# Patient Record
Sex: MALE | Race: AMERICAN INDIAN OR ALASKA NATIVE | NOT HISPANIC OR LATINO | URBAN - METROPOLITAN AREA
[De-identification: names, ages, dates, MRNs, and addresses within clinical notes are randomized per-mention and may not be internally consistent; named-entity substitution may affect disease eponyms.]

---

## 2021-05-31 ENCOUNTER — EMERGENCY (EMERGENCY)
Facility: HOSPITAL | Age: 61
LOS: 0 days | Discharge: HOME | End: 2021-05-31
Attending: EMERGENCY MEDICINE
Payer: MEDICAID

## 2021-05-31 VITALS
DIASTOLIC BLOOD PRESSURE: 82 MMHG | OXYGEN SATURATION: 96 % | RESPIRATION RATE: 18 BRPM | SYSTOLIC BLOOD PRESSURE: 140 MMHG | HEART RATE: 80 BPM | TEMPERATURE: 99 F

## 2021-05-31 VITALS — WEIGHT: 169.98 LBS

## 2021-05-31 DIAGNOSIS — R10.31 RIGHT LOWER QUADRANT PAIN: ICD-10-CM

## 2021-05-31 DIAGNOSIS — I86.1 SCROTAL VARICES: ICD-10-CM

## 2021-05-31 DIAGNOSIS — N50.819 TESTICULAR PAIN, UNSPECIFIED: ICD-10-CM

## 2021-05-31 DIAGNOSIS — N43.3 HYDROCELE, UNSPECIFIED: ICD-10-CM

## 2021-05-31 DIAGNOSIS — K40.90 UNILATERAL INGUINAL HERNIA, WITHOUT OBSTRUCTION OR GANGRENE, NOT SPECIFIED AS RECURRENT: ICD-10-CM

## 2021-05-31 LAB
ALBUMIN SERPL ELPH-MCNC: 4.5 G/DL — SIGNIFICANT CHANGE UP (ref 3.3–5)
ALP SERPL-CCNC: 119 U/L — SIGNIFICANT CHANGE UP (ref 40–120)
ALT FLD-CCNC: 58 U/L — SIGNIFICANT CHANGE UP (ref 12–78)
ANION GAP SERPL CALC-SCNC: 6 MMOL/L — SIGNIFICANT CHANGE UP (ref 5–17)
APPEARANCE UR: CLEAR — SIGNIFICANT CHANGE UP
AST SERPL-CCNC: 78 U/L — HIGH (ref 15–37)
BASOPHILS # BLD AUTO: 0.04 K/UL — SIGNIFICANT CHANGE UP (ref 0–0.2)
BASOPHILS NFR BLD AUTO: 0.6 % — SIGNIFICANT CHANGE UP (ref 0–2)
BILIRUB SERPL-MCNC: 0.5 MG/DL — SIGNIFICANT CHANGE UP (ref 0.2–1.2)
BILIRUB UR-MCNC: NEGATIVE — SIGNIFICANT CHANGE UP
BUN SERPL-MCNC: 7 MG/DL — SIGNIFICANT CHANGE UP (ref 7–23)
CALCIUM SERPL-MCNC: 10.2 MG/DL — HIGH (ref 8.5–10.1)
CHLORIDE SERPL-SCNC: 105 MMOL/L — SIGNIFICANT CHANGE UP (ref 96–108)
CO2 SERPL-SCNC: 29 MMOL/L — SIGNIFICANT CHANGE UP (ref 22–31)
COLOR SPEC: YELLOW — SIGNIFICANT CHANGE UP
CREAT SERPL-MCNC: 0.82 MG/DL — SIGNIFICANT CHANGE UP (ref 0.5–1.3)
DIFF PNL FLD: NEGATIVE — SIGNIFICANT CHANGE UP
EOSINOPHIL # BLD AUTO: 0.44 K/UL — SIGNIFICANT CHANGE UP (ref 0–0.5)
EOSINOPHIL NFR BLD AUTO: 6.6 % — HIGH (ref 0–6)
GLUCOSE SERPL-MCNC: 140 MG/DL — HIGH (ref 70–99)
GLUCOSE UR QL: 100 MG/DL
HCT VFR BLD CALC: 45.1 % — SIGNIFICANT CHANGE UP (ref 39–50)
HGB BLD-MCNC: 15 G/DL — SIGNIFICANT CHANGE UP (ref 13–17)
IMM GRANULOCYTES NFR BLD AUTO: 0.3 % — SIGNIFICANT CHANGE UP (ref 0–1.5)
KETONES UR-MCNC: NEGATIVE — SIGNIFICANT CHANGE UP
LEUKOCYTE ESTERASE UR-ACNC: NEGATIVE — SIGNIFICANT CHANGE UP
LYMPHOCYTES # BLD AUTO: 2.56 K/UL — SIGNIFICANT CHANGE UP (ref 1–3.3)
LYMPHOCYTES # BLD AUTO: 38.3 % — SIGNIFICANT CHANGE UP (ref 13–44)
MCHC RBC-ENTMCNC: 26.9 PG — LOW (ref 27–34)
MCHC RBC-ENTMCNC: 33.3 GM/DL — SIGNIFICANT CHANGE UP (ref 32–36)
MCV RBC AUTO: 81 FL — SIGNIFICANT CHANGE UP (ref 80–100)
MONOCYTES # BLD AUTO: 0.48 K/UL — SIGNIFICANT CHANGE UP (ref 0–0.9)
MONOCYTES NFR BLD AUTO: 7.2 % — SIGNIFICANT CHANGE UP (ref 2–14)
NEUTROPHILS # BLD AUTO: 3.15 K/UL — SIGNIFICANT CHANGE UP (ref 1.8–7.4)
NEUTROPHILS NFR BLD AUTO: 47 % — SIGNIFICANT CHANGE UP (ref 43–77)
NITRITE UR-MCNC: NEGATIVE — SIGNIFICANT CHANGE UP
PH UR: 7 — SIGNIFICANT CHANGE UP (ref 5–8)
PLATELET # BLD AUTO: 178 K/UL — SIGNIFICANT CHANGE UP (ref 150–400)
POTASSIUM SERPL-MCNC: 4.5 MMOL/L — SIGNIFICANT CHANGE UP (ref 3.5–5.3)
POTASSIUM SERPL-SCNC: 4.5 MMOL/L — SIGNIFICANT CHANGE UP (ref 3.5–5.3)
PROT SERPL-MCNC: 8.2 GM/DL — SIGNIFICANT CHANGE UP (ref 6–8.3)
PROT UR-MCNC: NEGATIVE MG/DL — SIGNIFICANT CHANGE UP
RBC # BLD: 5.57 M/UL — SIGNIFICANT CHANGE UP (ref 4.2–5.8)
RBC # FLD: 13.3 % — SIGNIFICANT CHANGE UP (ref 10.3–14.5)
SODIUM SERPL-SCNC: 140 MMOL/L — SIGNIFICANT CHANGE UP (ref 135–145)
SP GR SPEC: 1 — LOW (ref 1.01–1.02)
UROBILINOGEN FLD QL: NEGATIVE MG/DL — SIGNIFICANT CHANGE UP
WBC # BLD: 6.69 K/UL — SIGNIFICANT CHANGE UP (ref 3.8–10.5)
WBC # FLD AUTO: 6.69 K/UL — SIGNIFICANT CHANGE UP (ref 3.8–10.5)

## 2021-05-31 PROCEDURE — 87086 URINE CULTURE/COLONY COUNT: CPT

## 2021-05-31 PROCEDURE — 99285 EMERGENCY DEPT VISIT HI MDM: CPT

## 2021-05-31 PROCEDURE — 85025 COMPLETE CBC W/AUTO DIFF WBC: CPT

## 2021-05-31 PROCEDURE — 99284 EMERGENCY DEPT VISIT MOD MDM: CPT | Mod: 25

## 2021-05-31 PROCEDURE — 76870 US EXAM SCROTUM: CPT | Mod: 26

## 2021-05-31 PROCEDURE — 80053 COMPREHEN METABOLIC PANEL: CPT

## 2021-05-31 PROCEDURE — 36415 COLL VENOUS BLD VENIPUNCTURE: CPT

## 2021-05-31 PROCEDURE — 74177 CT ABD & PELVIS W/CONTRAST: CPT | Mod: 26,MA

## 2021-05-31 PROCEDURE — 81003 URINALYSIS AUTO W/O SCOPE: CPT

## 2021-05-31 PROCEDURE — 74177 CT ABD & PELVIS W/CONTRAST: CPT

## 2021-05-31 PROCEDURE — 76870 US EXAM SCROTUM: CPT

## 2021-05-31 NOTE — ED STATDOCS - CLINICAL SUMMARY MEDICAL DECISION MAKING FREE TEXT BOX
Pt with small varicocele, hydrocele, fat containing inguinal hernia.  D/c home with surgical follow up.

## 2021-05-31 NOTE — ED STATDOCS - OBJECTIVE STATEMENT
59 y/o male with no pertinent PMHX presents to the ED c/o RLQ abd pain x 3 days. Pt reports aching pain. Pt reports heavy lifting performed regularly, denies acute trauma, injuries, or falls. Denies fevers, n/v/d, or other presenting symptoms. Pt states pain present 3 months ago, resolved on own, ?inguinal hernia. No other complaints.

## 2021-05-31 NOTE — ED STATDOCS - PROGRESS NOTE DETAILS
US showing inguinal hernia but will confirm only fat containing on CT.  SMall hydrocele nad Pt. is a 60 year old male presents with RLQ/inguinal pain x 3 days.  Pt. reports heavy lifting regularly but no trauma.  Neg. swelling reported.  Mild testicular pain.  Pain started 3 months ago.  ?inguinal hernia.  will Us and obtain labs.  Sonia Cannon PA-C

## 2021-05-31 NOTE — ED STATDOCS - PATIENT PORTAL LINK FT
You can access the FollowMyHealth Patient Portal offered by [facility] by registering at the following website: [email or web address]. By joining [employer]’s FollowMyHealth portal, you will also be able to view your health information using other applications (apps) compatible with our system.

## 2021-05-31 NOTE — ED ADULT NURSE NOTE - NSIMPLEMENTINTERV_GEN_ALL_ED
Implemented All Universal Safety Interventions:  [place] to call system. Call bell, personal items and telephone within reach. Instruct patient to call for assistance. Room bathroom lighting operational. Non-slip footwear when patient is off stretcher. Physically safe environment: no spills, clutter or unnecessary equipment. Stretcher in lowest position, wheels locked, appropriate side rails in place.

## 2021-05-31 NOTE — ED STATDOCS - GASTROINTESTINAL, MLM
abdomen soft, +TTP along right spermatic cord. No palpable hernia. No overlying rash or redness. Mild posterior right testicular TTP with no swelling

## 2021-06-01 LAB
CULTURE RESULTS: NO GROWTH — SIGNIFICANT CHANGE UP
SPECIMEN SOURCE: SIGNIFICANT CHANGE UP

## 2023-12-10 ENCOUNTER — EMERGENCY (EMERGENCY)
Facility: HOSPITAL | Age: 63
LOS: 0 days | Discharge: HOME | End: 2023-12-10
Attending: EMERGENCY MEDICINE
Payer: MEDICAID

## 2023-12-10 VITALS
OXYGEN SATURATION: 99 % | HEART RATE: 80 BPM | TEMPERATURE: 98 F | SYSTOLIC BLOOD PRESSURE: 155 MMHG | DIASTOLIC BLOOD PRESSURE: 92 MMHG | RESPIRATION RATE: 18 BRPM

## 2023-12-10 DIAGNOSIS — R05.9 COUGH, UNSPECIFIED: ICD-10-CM

## 2023-12-10 DIAGNOSIS — J02.9 ACUTE PHARYNGITIS, UNSPECIFIED: ICD-10-CM

## 2023-12-10 PROCEDURE — 99283 EMERGENCY DEPT VISIT LOW MDM: CPT | Mod: 25

## 2023-12-10 PROCEDURE — 99284 EMERGENCY DEPT VISIT MOD MDM: CPT

## 2023-12-10 PROCEDURE — 71046 X-RAY EXAM CHEST 2 VIEWS: CPT

## 2023-12-10 PROCEDURE — 71046 X-RAY EXAM CHEST 2 VIEWS: CPT | Mod: 26

## 2023-12-10 RX ORDER — AZITHROMYCIN 500 MG/1
500 TABLET, FILM COATED ORAL ONCE
Refills: 0 | Status: COMPLETED | OUTPATIENT
Start: 2023-12-10 | End: 2023-12-10

## 2023-12-10 RX ORDER — AZITHROMYCIN 500 MG/1
1 TABLET, FILM COATED ORAL
Qty: 4 | Refills: 0
Start: 2023-12-10 | End: 2023-12-13

## 2023-12-10 RX ADMIN — AZITHROMYCIN 500 MILLIGRAM(S): 500 TABLET, FILM COATED ORAL at 08:57

## 2023-12-10 NOTE — ED ADULT NURSE NOTE - NSFALLUNIVINTERV_ED_ALL_ED
Bed/Stretcher in lowest position, wheels locked, appropriate side rails in place/Call bell, personal items and telephone in reach/Instruct patient to call for assistance before getting out of bed/chair/stretcher/Non-slip footwear applied when patient is off stretcher/[place] to call system/Physically safe environment - no spills, clutter or unnecessary equipment/Purposeful proactive rounding/Room/bathroom lighting operational, light cord in reach Bed/Stretcher in lowest position, wheels locked, appropriate side rails in place/Call bell, personal items and telephone in reach/Instruct patient to call for assistance before getting out of bed/chair/stretcher/Non-slip footwear applied when patient is off stretcher/[place] to call system/Physically safe environment - no spills, clutter or unnecessary equipment/Purposeful proactive rounding/Room/bathroom lighting operational, light cord in reach

## 2023-12-10 NOTE — ED PROVIDER NOTE - OBJECTIVE STATEMENT
63 year old male with no pertinent PMH presents with cc of sore throat. Mild URI like symptoms including cough and congestion. No cp, sob or palpitation. No abdominal pain. No fever or chills. No melena or hematochezia. No visual or focal neurological complaints. Ambulatory. No saddle anesthesia. No incontinence of urine or stool. No skin rash. No recent trauma. No seizures, syncope. No other health concerns.

## 2023-12-10 NOTE — ED ADULT NURSE NOTE - OBJECTIVE STATEMENT
pt presents to Ed with complaints of sore throat and cough. per son at bedside, pt has been having symptoms x 1 week.

## 2023-12-10 NOTE — ED PROVIDER NOTE - NSFOLLOWUPINSTRUCTIONS_ED_ALL_ED_FT
Please note that I have reviewed your x-ray. I do not see any evidence of any pathology on the xray however please follow up with the official reports. Return to us if any other health concerns. I have offered you blood work and further evaluation but you wish for xray and meds.     Please note that I have reviewed your x-ray imaging. I have provided you with my impression. It will often take 24 to 48 hours to get the official reports of the x-ray. Please either access the x-ray reports real time by using the link provided here for patient portal access or contact the radiology number or alternatively please contact your primary care provider to get the results of the x-ray for you.     ________  Cough, Adult  Coughing is a reflex that clears your throat and airways (respiratory system). It helps heal and protect your lungs. It is normal to cough from time to time. A cough that happens with other symptoms or that lasts a long time may be a sign of a condition that needs treatment. A short-term (acute) cough may only last 2–3 weeks. A long-term (chronic) cough may last 8 or more weeks.    Coughing is often caused by:  Diseases, such as:  An infection of the respiratory system.  Asthma or other heart or lung diseases.  Gastroesophageal reflux. This is when acid comes back up from the stomach.  Breathing in things that irritate your lungs.  Allergies.  Postnasal drip. This is when mucus runs down the back of your throat.  Smoking.  Some medicines.  Follow these instructions at home:  Medicines    Take over-the-counter and prescription medicines only as told by your health care provider.  Talk with your provider before you take cough medicine (cough suppressants).  Eating and drinking    Do not drink alcohol.  Avoid caffeine.  Drink enough fluid to keep your pee (urine) pale yellow.  Lifestyle    Avoid cigarette smoke.  Do not use any products that contain nicotine or tobacco. These products include cigarettes, chewing tobacco, and vaping devices, such as e-cigarettes. If you need help quitting, ask your provider.  Avoid things that make you cough. These may include perfumes, candles, cleaning products, or campfire smoke.  General instructions    A person holding a cloth over the mouth and nose while coughing.  Watch for any changes to your cough. Tell your provider about them.  Always cover your mouth when you cough.  If the air is dry in your bedroom or home, use a cool mist vaporizer or humidifier.  If your cough is worse at night, try to sleep in a semi-upright position.  Rest as needed.  Contact a health care provider if:  You have new symptoms, or your symptoms get worse.  You cough up pus.  You have a fever that does not go away or a cough that does not get better after 2–3 weeks.  You cannot control your cough with medicine, and you are losing sleep.  You have pain that gets worse or is not helped with medicine.  You lose weight for no clear reason.  You have night sweats.  Get help right away if:  You cough up blood.  You have trouble breathing.  Your heart is beating very fast.  These symptoms may be an emergency. Get help right away. Call 911.  Do not wait to see if the symptoms will go away.  Do not drive yourself to the hospital.  This information is not intended to replace advice given to you by your health care provider. Make sure you discuss any questions you have with your health care provider.

## 2023-12-10 NOTE — ED PROVIDER NOTE - PATIENT PORTAL LINK FT
You can access the FollowMyHealth Patient Portal offered by [facility] by registering at the following website: [email or web address]. By joining [employer]’s FollowMyHealth portal, you will also be able to view your health information using other applications (apps) compatible with our system. You can access the FollowMyHealth Patient Portal offered by [facility] by registering at the following website: [email or web address]. By joining [employer]’s FollowMyHealth portal, you will also be able to view your health information using other applications (apps) compatible with our system.

## 2023-12-10 NOTE — ED PROVIDER NOTE - CLINICAL SUMMARY MEDICAL DECISION MAKING FREE TEXT BOX
cough, sore throat, no clinical evidence of pulmonary findings (no crackles, no wheezing, no tachypnea), abx, follow up with primary care provider. Patient has no abdominal pain, no neck pain or stiffness, no skin rash, no fever and no other health concerns. Strict return precautions provided. Spoke with patient's son who is the health care proxy and present. Offered patient, blood work, and further work up but patient happy with abx, and xray imaging.

## 2023-12-10 NOTE — ED PROVIDER NOTE - DIFFERENTIAL DIAGNOSIS
Differential Diagnosis cough, sore throat, no clinical evidence of pulmonary findings (no crackles, no wheezing, no tachypnea), abx, follow up with primary care provider. Patient has no abdominal pain, no neck pain or stiffness, no skin rash, no fever and no other health concerns. Strict return precautions provided. Spoke with patient's son who is the health care proxy and present. Offered patient, blood work, and further work up but patient happy with abx, and xray imaging.

## 2023-12-27 ENCOUNTER — EMERGENCY (EMERGENCY)
Facility: HOSPITAL | Age: 63
LOS: 0 days | Discharge: HOME | End: 2023-12-27
Attending: EMERGENCY MEDICINE
Payer: MEDICAID

## 2023-12-27 VITALS
HEART RATE: 87 BPM | TEMPERATURE: 99 F | OXYGEN SATURATION: 95 % | DIASTOLIC BLOOD PRESSURE: 69 MMHG | SYSTOLIC BLOOD PRESSURE: 117 MMHG | RESPIRATION RATE: 19 BRPM

## 2023-12-27 VITALS — WEIGHT: 160.06 LBS | HEIGHT: 65 IN

## 2023-12-27 DIAGNOSIS — E11.9 TYPE 2 DIABETES MELLITUS WITHOUT COMPLICATIONS: ICD-10-CM

## 2023-12-27 DIAGNOSIS — U07.1 COVID-19: ICD-10-CM

## 2023-12-27 DIAGNOSIS — R09.81 NASAL CONGESTION: ICD-10-CM

## 2023-12-27 DIAGNOSIS — I10 ESSENTIAL (PRIMARY) HYPERTENSION: ICD-10-CM

## 2023-12-27 DIAGNOSIS — E78.00 PURE HYPERCHOLESTEROLEMIA, UNSPECIFIED: ICD-10-CM

## 2023-12-27 LAB
FLUAV AG NPH QL: SIGNIFICANT CHANGE UP
FLUAV AG NPH QL: SIGNIFICANT CHANGE UP
FLUBV AG NPH QL: SIGNIFICANT CHANGE UP
FLUBV AG NPH QL: SIGNIFICANT CHANGE UP
RSV RNA NPH QL NAA+NON-PROBE: SIGNIFICANT CHANGE UP
RSV RNA NPH QL NAA+NON-PROBE: SIGNIFICANT CHANGE UP
S PYO AG SPEC QL IA: NEGATIVE — SIGNIFICANT CHANGE UP
S PYO AG SPEC QL IA: NEGATIVE — SIGNIFICANT CHANGE UP
SARS-COV-2 RNA SPEC QL NAA+PROBE: DETECTED
SARS-COV-2 RNA SPEC QL NAA+PROBE: DETECTED

## 2023-12-27 PROCEDURE — 87081 CULTURE SCREEN ONLY: CPT

## 2023-12-27 PROCEDURE — 87880 STREP A ASSAY W/OPTIC: CPT

## 2023-12-27 PROCEDURE — 99285 EMERGENCY DEPT VISIT HI MDM: CPT

## 2023-12-27 PROCEDURE — 99284 EMERGENCY DEPT VISIT MOD MDM: CPT

## 2023-12-27 PROCEDURE — 0241U: CPT

## 2023-12-27 RX ORDER — ACETAMINOPHEN 500 MG
650 TABLET ORAL ONCE
Refills: 0 | Status: COMPLETED | OUTPATIENT
Start: 2023-12-27 | End: 2023-12-27

## 2023-12-27 RX ADMIN — Medication 600 MILLIGRAM(S): at 06:47

## 2023-12-27 RX ADMIN — Medication 650 MILLIGRAM(S): at 06:47

## 2023-12-27 NOTE — ED ADULT NURSE NOTE - CAS TRG GEN SKIN COLOR
Secondary Intention Text (Leave Blank If You Do Not Want): The defect will heal with secondary intention. Normal for race

## 2023-12-27 NOTE — ED PROVIDER NOTE - PHYSICAL EXAMINATION
Constitutional: NAD AAOx3  Eyes: PERRLA EOMI  ENT: mild posterior pharynx erythema uvula midline no signs of PTA full ROM of neck no meningismus  Head: Normocephalic atraumatic  Mouth: MMM  Cardiac: regular rate no LE swelling  Resp: unlabored breathing clear b/l  GI: Abd s/nt/nd  Neuro: grossly normal and intact  Skin: No visible rashes

## 2023-12-27 NOTE — ED ADULT TRIAGE NOTE - CHIEF COMPLAINT QUOTE
pt ambulatory to triage c/o flu like symptoms. pt was here 2 weeks ago with similar symptoms, started and finished course of antibiotics but symptoms returned 2 days ago. pt endorsing cough, congestion, fever tmax 101. pt took advil prior to arrival. denies chest pain, shortness of breath. past medical history DM, HTN, thyroid disease, HLD. pt is a/o4 and well appearing in triage.

## 2023-12-27 NOTE — ED ADULT NURSE NOTE - BIRTH SEX
Male Consent (Ear)/Introductory Paragraph: The rationale for Mohs was explained to the patient and consent was obtained. The risks, benefits and alternatives to therapy were discussed in detail. Specifically, the risks of ear deformity, infection, scarring, bleeding, prolonged wound healing, incomplete removal, allergy to anesthesia, nerve injury and recurrence were addressed. Prior to the procedure, the treatment site was clearly identified and confirmed by the patient. All components of Universal Protocol/PAUSE Rule completed.

## 2023-12-27 NOTE — ED PROVIDER NOTE - OBJECTIVE STATEMENT
63-year-old male history of hypertension high cholesterol diabetes presents the emergency department for upper respiratory symptoms.  Patient is here with son who is a radiology tech in the emergency department.  States that about 3 weeks ago patient had similar symptoms was diagnosed with upper respiratory infection and was prescribed azithromycin as well as Mucinex which helped the symptoms.  Patient's been using almond oil for nasal congestion.  2 days ago developed sore throats cough congestion again so came in for evaluation.  No chest pain or shortness of breath no abdominal pain no leg swelling.  Patient had fever last night.  Here on exam patient is well-appearing nontoxic he has mild erythema to the posterior pharynx with clear lungs symptoms likely viral discussed obtaining chest x-ray however patient and family do not want to do chest x-ray as he had one 2 weeks ago.  Agreeable to testing for viruses treating symptomatically no signs or concern for CHF at this time PE.  No PTA no signs of meningitis or RPA will symptom control and reassess.

## 2023-12-27 NOTE — ED ADULT NURSE REASSESSMENT NOTE - NS ED NURSE REASSESS COMMENT FT1
MD. spoke to pt, advised he was going to be d/c. Pt son asked ED tech if they needed d/c paperwork, and walked out without papers.

## 2023-12-27 NOTE — ED ADULT NURSE NOTE - NSFALLUNIVINTERV_ED_ALL_ED
Bed/Stretcher in lowest position, wheels locked, appropriate side rails in place/Call bell, personal items and telephone in reach/Instruct patient to call for assistance before getting out of bed/chair/stretcher/Non-slip footwear applied when patient is off stretcher/[place] to call system/Physically safe environment - no spills, clutter or unnecessary equipment/Purposeful proactive rounding/Room/bathroom lighting operational, light cord in reach Bed/Stretcher in lowest position, wheels locked, appropriate side rails in place/Call bell, personal items and telephone in reach/Instruct patient to call for assistance before getting out of bed/chair/stretcher/Non-slip footwear applied when patient is off stretcher/[place] to call system/Physically safe environment - no spills, clutter or unnecessary equipment/Purposeful proactive rounding/Room/bathroom lighting operational, light cord in reach

## 2023-12-27 NOTE — ED PROVIDER NOTE - CLINICAL SUMMARY MEDICAL DECISION MAKING FREE TEXT BOX
63-year-old male history of hypertension high cholesterol diabetes presents the emergency department for upper respiratory symptoms.  Patient is here with son who is a radiology tech in the emergency department.  States that about 3 weeks ago patient had similar symptoms was diagnosed with upper respiratory infection and was prescribed azithromycin as well as Mucinex which helped the symptoms.  Patient's been using almond oil for nasal congestion.  2 days ago developed sore throats cough congestion again so came in for evaluation.  No chest pain or shortness of breath no abdominal pain no leg swelling.  Patient had fever last night.  Here on exam patient is well-appearing nontoxic he has mild erythema to the posterior pharynx with clear lungs symptoms likely viral discussed obtaining chest x-ray however patient and family do not want to do chest x-ray as he had one 2 weeks ago.  Agreeable to testing for viruses treating symptomatically no signs or concern for CHF at this time PE.  No PTA no signs of meningitis or RPA will symptom control and reassess. 63-year-old male history of hypertension high cholesterol diabetes presents the emergency department for upper respiratory symptoms.  Patient is here with son who is a radiology tech in the emergency department.  States that about 3 weeks ago patient had similar symptoms was diagnosed with upper respiratory infection and was prescribed azithromycin as well as Mucinex which helped the symptoms.  Patient's been using almond oil for nasal congestion.  2 days ago developed sore throats cough congestion again so came in for evaluation.  No chest pain or shortness of breath no abdominal pain no leg swelling.  Patient had fever last night.  Here on exam patient is well-appearing nontoxic he has mild erythema to the posterior pharynx with clear lungs symptoms likely viral discussed obtaining chest x-ray however patient and family do not want to do chest x-ray as he had one 2 weeks ago.  Agreeable to testing for viruses treating symptomatically no signs or concern for CHF at this time PE.  No PTA no signs of meningitis or RPA will symptom control and reassess.  [id number] is COVID-positive.  Oxygen is 100% on room air ambulating without issue counseled patient and family comfortable going home and being discharged strict return precautions

## 2023-12-27 NOTE — ED PROVIDER NOTE - PATIENT PORTAL LINK FT
You can access the FollowMyHealth Patient Portal offered by [facility] by registering at the following website: [email or web address]. By joining [employer]’s FollowMyHealth portal, you will also be able to view your health information using other applications (apps) compatible with our system. You can access the FollowMyHealth Patient Portal offered by [facility] by registering at the following website: [email or web address]. By joining [facility]’s FollowMyHealth portal, you will also be able to view your health information using other applications (apps) compatible with our system.

## 2023-12-27 NOTE — ED ADULT NURSE NOTE - OBJECTIVE STATEMENT
pt is 62 yo A&Ox4 male c/o flu like symptoms. pt states he was here 10 days ago and prescribed mucinex and azithromycin. Pt endorses fevers and productive cough. pt denies cp and sob. Pt is well appearing an does not appear in acute distress. PMHx HTN, HLD, and DM. pt is 64 yo A&Ox4 male c/o flu like symptoms. pt states he was here 10 days ago and prescribed mucinex and azithromycin. Pt endorses fevers and productive cough. pt denies cp and sob. Pt is well appearing an does not appear in acute distress. PMHx HTN, HLD, and DM.

## 2023-12-27 NOTE — ED PROVIDER NOTE - NSFOLLOWUPINSTRUCTIONS_ED_ALL_ED_FT
1. return for worsening symptoms or anything concerning to you  2. take all home meds as prescribed  3. follow up with your pmd call to make an appointment  4. Take Tylenol 650 mg every 6 hours as needed for pain.    Upper Respiratory Infection, Adult  An upper respiratory infection (URI) affects the nose, throat, and upper air passages. URIs are caused by germs (viruses). The most common type of URI is often called "the common cold."    Medicines cannot cure URIs, but you can do things at home to relieve your symptoms. URIs usually get better within 7–10 days.    Follow these instructions at home:  Activity     Rest as needed.  If you have a fever, stay home from work or school until your fever is gone, or until your doctor says you may return to work or school.    You should stay home until you cannot spread the infection anymore (you are not contagious).  Your doctor may have you wear a face mask so you have less risk of spreading the infection.    Relieving symptoms     Gargle with a salt-water mixture 3–4 times a day or as needed. To make a salt-water mixture, completely dissolve ½–1 tsp of salt in 1 cup of warm water.  Use a cool-mist humidifier to add moisture to the air. This can help you breathe more easily.  Eating and drinking     Drink enough fluid to keep your pee (urine) pale yellow.  ImageEat soups and other clear broths.  General instructions     Take over-the-counter and prescription medicines only as told by your doctor. These include cold medicines, fever reducers, and cough suppressants.  Do not use any products that contain nicotine or tobacco. These include cigarettes and e-cigarettes. If you need help quitting, ask your doctor.  Avoid being where people are smoking (avoid secondhand smoke).  Make sure you get regular shots and get the flu shot every year.  ImageKeep all follow-up visits as told by your doctor. This is important.  How to avoid spreading infection to others     Wash your hands often with soap and water. If you do not have soap and water, use hand .  Avoid touching your mouth, face, eyes, or nose.  ImageCough or sneeze into a tissue or your sleeve or elbow. Do not cough or sneeze into your hand or into the air.  Contact a doctor if:  You are getting worse, not better.  You have any of these:    A fever.  Chills.  Brown or red mucus in your nose.  Yellow or brown fluid (discharge)coming from your nose.  Pain in your face, especially when you bend forward.  Swollen neck glands.  Pain with swallowing.  White areas in the back of your throat.    Get help right away if:  You have shortness of breath that gets worse.  You have very bad or constant:    Headache.  Ear pain.  Pain in your forehead, behind your eyes, and over your cheekbones (sinus pain).  Chest pain.    You have long-lasting (chronic) lung disease along with any of these:    Wheezing.  Long-lasting cough.  Coughing up blood.  A change in your usual mucus.    You have a stiff neck.  You have changes in your:    Vision.  Hearing.  Thinking.  Mood.    Summary  An upper respiratory infection (URI) is caused by a germ called a virus. The most common type of URI is often called "the common cold."  URIs usually get better within 7–10 days.  Take over-the-counter and prescription medicines only as told by your doctor.  This information is not intended to replace advice given to you by your health care provider. Make sure you discuss any questions you have with your health care provider.

## 2024-04-20 ENCOUNTER — EMERGENCY (EMERGENCY)
Facility: HOSPITAL | Age: 64
LOS: 1 days | Discharge: HOME | End: 2024-04-20
Attending: STUDENT IN AN ORGANIZED HEALTH CARE EDUCATION/TRAINING PROGRAM | Admitting: STUDENT IN AN ORGANIZED HEALTH CARE EDUCATION/TRAINING PROGRAM
Payer: MEDICAID

## 2024-04-20 VITALS
DIASTOLIC BLOOD PRESSURE: 73 MMHG | HEART RATE: 81 BPM | TEMPERATURE: 98 F | OXYGEN SATURATION: 98 % | SYSTOLIC BLOOD PRESSURE: 158 MMHG | RESPIRATION RATE: 16 BRPM

## 2024-04-20 VITALS
SYSTOLIC BLOOD PRESSURE: 170 MMHG | TEMPERATURE: 98 F | RESPIRATION RATE: 18 BRPM | HEART RATE: 90 BPM | OXYGEN SATURATION: 98 % | WEIGHT: 173.94 LBS | DIASTOLIC BLOOD PRESSURE: 96 MMHG

## 2024-04-20 LAB
ALBUMIN SERPL ELPH-MCNC: 4.1 G/DL — SIGNIFICANT CHANGE UP (ref 3.3–5)
ALP SERPL-CCNC: 114 U/L — SIGNIFICANT CHANGE UP (ref 40–120)
ALT FLD-CCNC: 32 U/L — SIGNIFICANT CHANGE UP (ref 12–78)
ANION GAP SERPL CALC-SCNC: 6 MMOL/L — SIGNIFICANT CHANGE UP (ref 5–17)
APTT BLD: 32.6 SEC — SIGNIFICANT CHANGE UP (ref 24.5–35.6)
AST SERPL-CCNC: 46 U/L — HIGH (ref 15–37)
BASOPHILS # BLD AUTO: 0.05 K/UL — SIGNIFICANT CHANGE UP (ref 0–0.2)
BASOPHILS NFR BLD AUTO: 0.9 % — SIGNIFICANT CHANGE UP (ref 0–2)
BILIRUB SERPL-MCNC: 0.4 MG/DL — SIGNIFICANT CHANGE UP (ref 0.2–1.2)
BUN SERPL-MCNC: 8 MG/DL — SIGNIFICANT CHANGE UP (ref 7–23)
CALCIUM SERPL-MCNC: 9.4 MG/DL — SIGNIFICANT CHANGE UP (ref 8.5–10.1)
CHLORIDE SERPL-SCNC: 107 MMOL/L — SIGNIFICANT CHANGE UP (ref 96–108)
CO2 SERPL-SCNC: 27 MMOL/L — SIGNIFICANT CHANGE UP (ref 22–31)
CREAT SERPL-MCNC: 0.98 MG/DL — SIGNIFICANT CHANGE UP (ref 0.5–1.3)
EGFR: 87 ML/MIN/1.73M2 — SIGNIFICANT CHANGE UP
EOSINOPHIL # BLD AUTO: 0.62 K/UL — HIGH (ref 0–0.5)
EOSINOPHIL NFR BLD AUTO: 10.6 % — HIGH (ref 0–6)
FLUAV AG NPH QL: SIGNIFICANT CHANGE UP
FLUBV AG NPH QL: SIGNIFICANT CHANGE UP
GLUCOSE SERPL-MCNC: 305 MG/DL — HIGH (ref 70–99)
HCT VFR BLD CALC: 43 % — SIGNIFICANT CHANGE UP (ref 39–50)
HGB BLD-MCNC: 14.1 G/DL — SIGNIFICANT CHANGE UP (ref 13–17)
IMM GRANULOCYTES NFR BLD AUTO: 0.3 % — SIGNIFICANT CHANGE UP (ref 0–0.9)
INR BLD: 0.95 RATIO — SIGNIFICANT CHANGE UP (ref 0.85–1.18)
LYMPHOCYTES # BLD AUTO: 1.65 K/UL — SIGNIFICANT CHANGE UP (ref 1–3.3)
LYMPHOCYTES # BLD AUTO: 28.2 % — SIGNIFICANT CHANGE UP (ref 13–44)
MAGNESIUM SERPL-MCNC: 2.1 MG/DL — SIGNIFICANT CHANGE UP (ref 1.6–2.6)
MCHC RBC-ENTMCNC: 26.6 PG — LOW (ref 27–34)
MCHC RBC-ENTMCNC: 32.8 GM/DL — SIGNIFICANT CHANGE UP (ref 32–36)
MCV RBC AUTO: 81.1 FL — SIGNIFICANT CHANGE UP (ref 80–100)
MONOCYTES # BLD AUTO: 0.51 K/UL — SIGNIFICANT CHANGE UP (ref 0–0.9)
MONOCYTES NFR BLD AUTO: 8.7 % — SIGNIFICANT CHANGE UP (ref 2–14)
NEUTROPHILS # BLD AUTO: 3 K/UL — SIGNIFICANT CHANGE UP (ref 1.8–7.4)
NEUTROPHILS NFR BLD AUTO: 51.3 % — SIGNIFICANT CHANGE UP (ref 43–77)
NRBC # BLD: 0 /100 WBCS — SIGNIFICANT CHANGE UP (ref 0–0)
PLATELET # BLD AUTO: 158 K/UL — SIGNIFICANT CHANGE UP (ref 150–400)
POTASSIUM SERPL-MCNC: 4.4 MMOL/L — SIGNIFICANT CHANGE UP (ref 3.5–5.3)
POTASSIUM SERPL-SCNC: 4.4 MMOL/L — SIGNIFICANT CHANGE UP (ref 3.5–5.3)
PROT SERPL-MCNC: 8.1 G/DL — SIGNIFICANT CHANGE UP (ref 6–8.3)
PROTHROM AB SERPL-ACNC: 11.1 SEC — SIGNIFICANT CHANGE UP (ref 9.5–13)
RBC # BLD: 5.3 M/UL — SIGNIFICANT CHANGE UP (ref 4.2–5.8)
RBC # FLD: 13.2 % — SIGNIFICANT CHANGE UP (ref 10.3–14.5)
RSV RNA NPH QL NAA+NON-PROBE: SIGNIFICANT CHANGE UP
SARS-COV-2 RNA SPEC QL NAA+PROBE: SIGNIFICANT CHANGE UP
SODIUM SERPL-SCNC: 140 MMOL/L — SIGNIFICANT CHANGE UP (ref 135–145)
WBC # BLD: 5.85 K/UL — SIGNIFICANT CHANGE UP (ref 3.8–10.5)
WBC # FLD AUTO: 5.85 K/UL — SIGNIFICANT CHANGE UP (ref 3.8–10.5)

## 2024-04-20 PROCEDURE — 71250 CT THORAX DX C-: CPT | Mod: MC

## 2024-04-20 PROCEDURE — 99284 EMERGENCY DEPT VISIT MOD MDM: CPT

## 2024-04-20 PROCEDURE — 71046 X-RAY EXAM CHEST 2 VIEWS: CPT | Mod: 26

## 2024-04-20 PROCEDURE — 85730 THROMBOPLASTIN TIME PARTIAL: CPT

## 2024-04-20 PROCEDURE — 85025 COMPLETE CBC W/AUTO DIFF WBC: CPT

## 2024-04-20 PROCEDURE — 83735 ASSAY OF MAGNESIUM: CPT

## 2024-04-20 PROCEDURE — 85610 PROTHROMBIN TIME: CPT

## 2024-04-20 PROCEDURE — 99284 EMERGENCY DEPT VISIT MOD MDM: CPT | Mod: 25

## 2024-04-20 PROCEDURE — 36415 COLL VENOUS BLD VENIPUNCTURE: CPT

## 2024-04-20 PROCEDURE — 87637 SARSCOV2&INF A&B&RSV AMP PRB: CPT

## 2024-04-20 PROCEDURE — 71250 CT THORAX DX C-: CPT | Mod: 26,MC

## 2024-04-20 PROCEDURE — 80053 COMPREHEN METABOLIC PANEL: CPT

## 2024-04-20 PROCEDURE — 71046 X-RAY EXAM CHEST 2 VIEWS: CPT

## 2024-04-20 NOTE — ED PROVIDER NOTE - PHYSICAL EXAMINATION
Lungs wheeze RLL, no increased work of breathing, speaking in full sentences, no rhinorrhea, throat clear no vesicles, uvula midline, no cervical lymphadenopathy.

## 2024-04-20 NOTE — ED PROVIDER NOTE - ATTENDING APP SHARED VISIT CONTRIBUTION OF CARE
Niall Alejo MD (Attending Physician):    I performed a history and physical exam of the patient and discussed their management with the JAQUELINE. I have reviewed the JAQUELINE note and agree with the documented findings and plan of care, except as noted. This was a shared visit with an JAQUELINE. I reviewed and verified the documentation and independently performed my own history/exam/medical decision making. My medical decision making and observations are found below. Please refer to any progress notes for updates on clinical course.    HPI:  62 yo male with PMHx of HTN, HLD, DM, and hypothyroidism presenting to ER with complaints of cough x10 days. Patient was seen at  and given Bromphen and Mucinex without relief. Patient denies fever, shaking chills, n/v. Denies recent travel or sick contacts.    PE:  GEN - NAD, well appearing, A&Ox3  HEAD - NC/AT  EYES - PERRL, EOMI  ENT - Airway patent, mucous membranes moist, oropharynx wnl (no inflammation, swelling, exudates, or lesions)  PULMONARY - CTA b/l, symmetric breath sounds, no W/R/R  CARDIAC - +S1S2, RRR, no M/G/R, no JVD  ABDOMEN - +BS, ND, NT, soft, no guarding, no rebound, no masses, no rigidity   - No CVA TTP b/l  EXTREMITIES - FROM, symmetric pulses, no edema  SKIN - No rash or bruising  NEUROLOGIC - Alert, speech clear, no focal deficits  PSYCH - Normal mood/affect, normal insight    MDM:  DDx includes, but not limited to: PNA, bronchitis, viral syndrome. CXR, CT chest, labs. Dispo pending w/u.

## 2024-04-20 NOTE — ED PROVIDER NOTE - PATIENT PORTAL LINK FT
You can access the FollowMyHealth Patient Portal offered by [facility] by registering at the following website: [email or web address]. By joining [employer]’s FollowMyHealth portal, you will also be able to view your health information using other applications (apps) compatible with our system.

## 2024-04-20 NOTE — ED PROVIDER NOTE - OBJECTIVE STATEMENT
64 yo male with PMHx of HTN, HLD, DM, and hypothyroidism presenting to ER with complaints of cough x10 days. Patient was seen at  and given Bromphen and Mucinex without relief. Patient denies fever, shaking chills, n/v. Denies recent travel or sick contacts.

## 2024-04-20 NOTE — ED PROVIDER NOTE - CPE EDP GASTRO NORM
normal... You can access the FollowMyHealth Patient Portal offered by [facility] by registering at the following website: [email or web address]. By joining [employer]’s FollowMyHealth portal, you will also be able to view your health information using other applications (apps) compatible with our system.

## 2024-04-20 NOTE — ED ADULT NURSE NOTE - OBJECTIVE STATEMENT
A/ox4 patient came to ED c/o coughing, congestion, rash. Afebrile, no n/v/d. Pending further radiology reports.

## 2024-04-20 NOTE — ED PROVIDER NOTE - NSFOLLOWUPINSTRUCTIONS_ED_ALL_ED_FT
???? ???? ????? ?? ???????? ??:  ????? ??????????? ???? ??? ????? ??????????? ???? ??????? ??? ???? ?? ??? ??? ?? ????? ?? ????? ?? ???? ???? ?? ?? ?????? ???????? ??? ???? ??? ??????????? ?????????? ?? ???? ???? ???????? ??????? ?????? ?? ???? ?? ?? ???? ???  ????? ??????????? ?? ????? ?? ????? ???? ????  ????? ?? 3 ?????? ?? ???? ??  ???? ?? ??? ??? ???  ??? ?????, ??? ??? ????? ????  ?????  ??????? ?? ???? ???? ????? ???? ?? ???? ??? ???? ????  ?? ?? ???? ???? ??? ?? ?????? ??? ?? ??????? ?? ???? ???? ?? ????? ??????????? ?? ???? ???? ???? ???? ??? ???? ?????????? ??? ?? ???? ?? ?? ???????? ?? ???? ??:  ?? ????? ???? ????? ???? ????? ?? ????? ?? ?? ?? ???? ????  ?????????????? ???? ??????? ??? ???? ?? ???? ???? ??? ??? ???? ??? ?? ????? ?? ??? ???? ???? ????? ???? ???? ???? ????? ?? ???? ???? ??? ??? ??? ???? ???  ??????? ??? ?? ???? ???? ???? ????????? ???? ??????? ???? ????? ?? ???? ???? ?? ?? ????? ??? ??? ???? ?? ??? ?? ?? ???? ??????? ?? ???? ??? ?????? ???? ????????? ?? ????? ?? ??? ???? ??? ???? ??? ???? ????????? ???? ??????? ?? ???? ?? ?? ???? ???? ?? ???? ?????? ?? ??? ????? ?? ????? ???? ????? ???? ???? ???????   ????????? ??? ????? ?? ???? ???? ???? ??????? ?? ???? ???? ???  ??? ???? ??????????? ?????????? ?? ???? ???? ?? ?? ??? ???? ?????? ?? ??? ?????? ?? ?? ????????????? ?? ?? ???? ????  ???????????? ???? ?? ????? ?? ?? ???? ??? ?? ?????? ?? ???? ?? ???? ?????? ??? ????? ?? ????? ???? ?? ?? ????? ??? ???? ??? ????????? ?? ???? ????? ?? ??? ???? ????? ?? ????? ?? ??? ??? ???? ???? ????? ?? ????? ?? ????? ???????????? ?? ?? ?? ????, ?? ???? ?????? ?? ??????????? ?? ?????? ??? ??? ????? ?? ? ???? ??? ?? ???????????? ???? ?? ?????? ?????? ???? ???  ????????? ???? ?? ???? ?? ????? ?? ?? ???? ??? ??? ???? ???? ?? ??? ?????? ?? ???? ?? ??? ?? ???? ???? ?? ?????? ??? ????????? ??? ????? ??? ??? ??? ????????? ?? ?????? ?? ???????? ???? ?? ???? ??? ??? ?? ???? ???? ???? ???? ??? ???? ???, ?? ????? ???? ????????? ???? ??????? ?? ????? ?? ???? ????????? ???? ??? ???????? ???? ????? ??? ?? ???? ????? ?? ????????? ?? ???? ????? ??? ???? ??????? ?? ???? ???????? ?? ???? ????  ????????????? ??? ?????? ?????? ?????????? ???? ?? ??? ???? ??????? ?? ???? ??? ?????? ???? ?? ???????? ?? ???? ??? ????? ?? ???????? ????? ??? ?????? ???? ?? ?? ??? ?? ??? ?????? ???? ??? ????????? ???  ??? ????? ????????????? ?? ????? ????? ???? ???? ?? ??? ??? ??? ??? ??? ???? ??? ???? ???? ???? ???? ??? ????? ?? ???? ?? ?? ???? ????? ?? ???? ??? ??? ??? ???? ??? ???   ???? ???? ????. ???? ???? ???? ?? ??? ???? ??? ??? ???? ??? ????-???? ?? ??? ???? ???? ???? ????? ?? ???? ???? ???????? ????? ?? ?? ???? ????? ??? ????? ??????????? ?? ????? ??? ???? ??? ?? ???? ????   ?? ????? ?? ???? ??? ????? ????? ???? ???????? ?? ???? ??? ???????? ???? ???????? ???? ?? ???? ??????, ????? ?? ?????? ?? ??????? ???? ???? ????????? ???? ??????? ?? ????? ?? ???? ???? ???????? ?? COVID-19 ?? ???? ?? ?????? ?????? ???? ????????? ???? ??????? ???? ??? ???? ?? ?? ???? ???? ???? ???? ?? ?????? ????? ?? ?????? ?? ?????? ??????  ???????? ?? ?????? ?? ?????. ???? ??? ???-??? ????? ?? ???? ?? ????? ???? ??? ??????-???? ???? ???? ?? ??? ????? ?? ????? ?? ???? ?????? ? ?? ?? ?? ???? ????? ?? ??? ???? ?? ??? ???? ?? ??? ?? ????? ?? ???? ???? ??? ????   ?? ?? ???? ???? ???? ??? ???? ?? ??? ??? ?? ?? ???? ?????, ??? ?? ???? ?? ? ?????  ???????? ?? ????? ?? ????? ?? ??? ?????? ??? ????? ???? ???? ????? ???? ??? ?? ???? ?????? ?? ???? ???? ?? ?????? ??? ?????? ???? ????? ??? ???? ??-??? ?? ????? ?? ???? ?? ?? ?????? ??? ???? ???? ?? ????  ??? ????? ?? ????? ?? ???? ?? ???? ???? ?? ????? ????? ??? ?? ????? ??? ?? ????? ?? ??? ?????? ?? ??? ?????  ??? ??? ????? ??? ???? ???? ???? ?????? ?? ????. ???????, ???? ?? ??? ?? ????? ??? ???? ??? ?? ???? ?? ????? ??? ???? ?? ?? ??? ????? ?????? ??? ????? ???? ??????? ?? ???? ???? ?? ?? ?? ?? ???? ?? ????? ??? ???? ?????? ??, ?? ???????? ?? ?????? ???? ???? ?? ?? ?? ???? ?? ????? ??????-?? ?????????, ?? ?????? ?? ???? ?????? ???? ?????? ???????? ?? ????? ? ?????  ???????? ? ???? ?? ???????? ???? ???? ???? ????? ?? ????? ? ????? ?????? ?? ????? ??? ????? ??????? ?? ???? ????? ??????? ?? ?????? ?????? ???? ???? ??? ?? ??????? ??? ???????? ???? ??? ?? ??? ?????? ?? ??? ?????? ?? ???????? ??, ?? ??????? ?? ??? ???? ????????? ???? ??????? ?? ?????? ?-?????? ?? ???? ???? ?????? ??? ??? ?? ??????? ???? ??? ?? ???????? ?? ????? ???? ?? ???? ???? ????????? ???? ??????? ?? ??? ????? ???? ?????? ?????? ?? ???? ??????  ???? ????? ?? ??? ??? ??? ???  ???? ???? ?? ????? ???? ??? ???? ????  ?? ?? ???? ???? ??? ?? ???? ??? ????? ???? ?? ?? ???? ???????? ??? ???? ??? ??? ??? ???? ???? ?????? ?? ?? ?????? ??????  ????? ?? ??? ?? ???? ????? ??? ???? ???? ?? ???? ?? ???? ????  ???? ????? 4 ?????? ?? ???? ??? ???? ???? ???  ???? ?????? ?? ?????? ?? ???? ??? ???? ??? ?????? ?? ??????? ???? ?????? ??????:  ???? ???? ?????? ?? ??????? ???? ??? ??? ???? ?? ?? ??? ???? ????????? ?????? ?? ???? ??? ????? ?? ???? ???? ???? ???? ?? ???? ??? ?? ??? ?? ?????? ??????? ???? ????? ???, ?? ?? ???? ?? ??? ???? ????????? ???? ????????? ?? ??? ????? ???????? ?? ????? ????? ???? ??? ????? ???? ?? ??? ???? ?? ?????? ??? aapako cintia jaanane gracy aavashyakata pradip:  teevr bronkaitis cintia pradip? teevr bronkaitis aapake phephadon mein soojan aur jalan pradip. yah aamataur par vaayaras ke kaaran hota pradip aur adhikatar sardiyon mein hota pradip. bronkaitis baikteeriya ya dhuen jaise raasaayanik uttejak padaart ke kaaran bhee ho sakata pradip.  teevr bronkaitis ke lakshan aur lakshan cintia chanelle?  daryn jaswinder 3 saptaah josué rahatee pradip  bahatee ya bharee huee naak  gala baithana, gale mein kharaash hona  bukhaar  saamaany se adhik thakaan mahasoos hona aur shareer mein dard hona  jab aap saans lete chanelle ya khaansate chanelle to [facility] ya dard hota pradip teevr bronkaitis ka ilaaj kaise alejandra jaata pradip? aapako nimnalikhit mein se kisee gracy bhee aavashyakata ho sakatee pradip:  kaph dabaane vaalee davaen aapakee khaansee gracy ichchha ko anton anna detee chanelle.  dikongestent aapake phephadon mein balagam ko dheela karane mein madad karate chanelle aur khaansee ko door karana aasaan banaate chanelle. isase aapako aasaanee se saans adam mein madad mil sakatee pradip.  inhelar die ja sakate chanelle. aapaka svaasthy seva pradaata aapako aasaanee se saans adam aur anton khaansee mein madad karane ke lie ek ya adhik inhelar de sakata pradip. inahelar aapake vaayumaarg ko kholane ke lie aapako nemo deta pradip. apane svaasthy seva pradaata se kahen ki vah aapako batae ki apane inahelar ka sahee tareeke se upayog kaise pierre. meetar doz inhelar   enteevaayaral nemo vaayaras ke kaaran carley vaale sankraman ka ilaaj karatee pradip.  hal aapaka bronkaitis baikteeriya ke kaaran hota pradip ya hal aapako phephade gracy koee samasya pradip to enteebaayotiks kristie ja sakatee chanelle.  esitaaminophen dard aur bukhaar ko anton karata pradip. yah doktar ke aadesh ke daiana [facility] pradip. poochhen ki kitana fidel pradip aur kitanee baar fidel pradip. nirdeshon ka paalan pierre. aap jin any davaon ka upayog anna rahe chanelle unake lebal padhen aur dekhen ki unamen esitaaminophen bhee pradip ya nahin, ya apane doktar ya phaarmaasist se [facility]. agar sahee tareeke se na topher jacqui to esitaaminophen leevar ko nukasaan pahuncha sakata pradip.  eneseaeedee soojan aur dard ya bukhaar ko anton karane mein madad karate chanelle. yah nemo doktar ke aadesh ke saa ya usake daiana bhee [facility] pradip. eneseaeedee kuchh logon mein pet mein raktasraav ya gurde gracy samasyaen paida anna sakata pradip. hal aap rakt patala karane vaalee nemo lete chanelle, to hamesha apane svaasthy seva pradaata se poochhen ki cintia eneseaeedee aapake lie surakshit chanelle. hamesha nemo ka lebal padhen aur nirdeshon ka paalan pierre. main apane lakshanon ko kaise prabandhit anna sakata hoon?  nirdeshaanusaar taral [facility] piyen. haidreted rahane ke lie aapako saamaany se adhik taral padaarth peene gracy aavashyakata ho sakatee pradip. poochhen ki pratidin kitana taral padaarth peena pradip aur kaun sa taral padaarth aapake lie sarvottam pradip.  cierra mist hyoomidifaayar ka upayo pierre. isase aapake ghar mein hava mein namee bad jaatee pradip. isase aapako saans adam mein aasaanee ho sakatee pradip aur aapakee khaansee anton karane mein madad mil sakatee pradip. namee   adhik aaraam pierre. aaraam aapake shareer ko theek carley mein madad karata pradip. dheere-dheere mike din adhik karana shuroo pierre. jab aapako isakee aavashyakata mahasoos ho to deepaabulmaro jay. main teevr bronkaitis ko rokane mein kaise madad anna sakata hoon?   un teekon ke baare mein poochhen jinakee aapako aavashyakata ho [facility]ate[facility]. pratyek varsh yathaasheeghr phloo ka tee lagavaen, aamataur par sitambar ya aktoobar mein. apane svaasthy seva pradaata se poochhen ki cintia aapako nimoniya ya chovid-19 ka teeMercy Philadelphia Hospitalee lagavaana chaahie. aapaka svaasthy seva pradaata aapako bata sakata pradip ki cintia aapako any teeke bhee lagavaane chaahie aur unhen kab lagavaana chaahie.  rogaanuon ke prasaar ko roken. apane haath baar-baar saabun aur paanee se dhoen. apane saath rogaanu-naashak haind loshan ya jel rakhen. jab saabun aur paanee upalabdh na ho to aap apane haathon ko saaph karane ke lie loshan ya jel ka upayog anna sakate chanelle. haath dhona   jab josué aapane pahale apane haath nahin dho lie hon tab josué apanee aankhon, naak ya munh ko na chhuen.  keetaanuon ko phailane se rokane ke lie khaansate samay hamesha apana munh dhaken. apane haath gracy bajaay tishyoo ya apanee shart gracy aasteen mein khaansana sabase achchha pradip. apane aas-paas ke logon se kahen ki ve khaansate samay apana munh dhak tommie.  jin logon ko sardee ya phloo pradip unase bachane gracy kosOur [facility] of [facility] pierre. agar aap beemaar chanelle to jitana ho sake doosaron se door rahen.  hava mein maujood jalan paida karane vaale tatvon se bachen. rasaayanon, dhuen aur dhool se bachen. hal aapako dhool ya dhuen ke aasapaas kaam karana pradip to phes maask pahanen. jin dinon vaayu pradooshan ka star adhik ho to ar ke andPullman Regional Hospitalayesha felixhen. hal aapako elarje pradip, to paraagakanon gracy sankhya adhik carley par ar ke andar rimaayesha felixhen. spre-on diodorent, bag spre aur heyar spre jaise erosol utpaadon ka upayog na pierre.  dhoomrapaan na pierre ya dhoomrapaan karane vaale any logon ke aasapaas na rahen. sigaret aur sigaar mein maujood nikoteen aur any rasaayan phephadon ko nukasaan pahuncha sakate chanelle. hal aap vartamaan mein dhoomrapaan karate chanelle aur ise chhodane ke lie sahaayata gracy aavashyakata pradip, to jaanakaaree ke lie apane svaasthy seva pradaata se poochhen. ee-sigaret ya dhuaan rahit tambaakoo mein abhee bhee nikoteen hota pradip. in utpaadon ka upayog karane se pahale apane svaasthy seva pradaata se baat pierre. mujhe tatkaal dekhabhaal kab lenee chaahie?  aapako khaansee ke saath khoon aata pradip.  aapake honth ya naakhoon neele pad jaate chanelle.  jab aap saans lete chanelle to aapako harlan mahasoos hota pradip ki aapako paryaapt hava nahin mil rahee pradip. mujhe apane doktar ko kab bulaana chaahie?  upachaar ke baad bhee aapake lakshan door nahin hote ya badatar ho jaate chanelle.  aapakee khaansee 4 saptaah ke bheetar theek nahin hotee pradip.  aapakee sthiti ya dekhabhaal ke baare mein aapake koee prashn ya chintaen chanelle. dekhabhaal samajhauta:  aapako apanee dekhabhaal ko niyojit karane mein madad karane ka hak pradip. apanee svaasthy sthiti ke baare mein jaanen aur isaka ilaaj kaise alejandra ja sakata pradip. aap kaun see dekhabhaal praapt karana chaahate chanelle, yah ciara karane ke lie apane svaasthy seva pradaataon ke [place] upachaar vikalpon par charcha pierre. aapake paas hamesha ilaaj se mirela karane ka adhikaar pradip. Show more aapako cintia jaanane gracy aavashyakata pradip:  teevr bronkaitis cintia pradip? teevr bronkaitis aapake phephadon mein soojan aur jalan pradip. yah aamataur par vaayaras ke kaaran hota pradip aur adhikatar sardiyon mein hota pradip. bronkaitis baikteeriya ya dhuen jaise raasaayanik uttejak padaart ke kaaran bhee ho sakata pradip.  teevr bronkaitis ke lakshan aur lakshan cintia chanelle?  khaansee jaswinder 3 sapta josué rahatee pradip  bahatee ya bharee huee naak  gala baithana, gale mein kharaash hona  bukhaar  saamaany se adhik thakaan mahasoos hona aur shareer mein dard hona  jab aap saans lete chanelle ya khaansate chanelle to [facility] ya dard hota pradip teevr bronkaitis ka ilaaj kaise alejandra jaata pradip? aapako nimnalikhit mein se kisee gracy bhee aavashyakata ho sakatee pradip:  kaph dabaane vaalee davaen aapakee khaansee gracy ichchha ko anton anna detee chanelle.  dikongestent aapake phephadon mein balagam ko dheela karane mein madad karate chanelle aur khaansee ko door karana aasaan banaate chanelle. isase aapako aasaanee se saans adam mein madad mil sakatee pradip.  inhelar die ja sakate chanelle. aapaka svaasthy seva pradaata aapako aasaanee se saans adam aur anton khaansee mein madad karane ke lie ek ya adhik inhelar de sakata pradip. inahelar aapake vaayumaarg ko kholane ke lie aapako nemo deta pradip. apane svaasthy seva pradaata se kahen ki vah aapako batae ki apane inahelar ka sahee tareeke se upayog kaise pierre. meetar doz inhelar   enteevaayaral nemo vaayaras ke kaaran carley vaale sankraman ka ilaaj karatee pradip.  hal aapaka bronkaitis baikteeriya ke kaaran hota pradip ya hal aapako phephade gracy koee samasya pradip to enteebaayotiks kristie ja sakatee chanelle.  esitaaminophen dard aur bukhaar ko anton karata pradip. yah doktar ke aadesh ke daiana upalabdh pradip. poK[facility] Centerhen ki kitana fidel pradip aur kitanee baar fidel pradip. nirdeshon ka paalan pierre. aap jin any davaon ka upayog anna rahe chanelle unake lebal padhen aur dekhen ki unamen esitaaminophen bhee pradip ya nahin, ya apane doktar ya phaarmaasist se poKentucky River Medical Centerhen. agar sahee tareeke se na topher jacqui to esitaaminophen leevar ko nukasaan pahuncha sakata pradip.  eneseaeedee soojan aur dard ya bukhaar ko anton karane mein madad karate chanelle. yah nemo doktar ke aadesh ke saath ya usake daiana bhee [facility] pradip. eneseaeedee kuchh logon mein pet mein raktasraav ya gurde gracy samasyaen paida anna sakata pradip. hal aap rakt patala karane vaalee nemo lete chanelle, to hamesha apane svaasthy seva pradaata se poKentucky [facility] Medical Centerhen ki cintia eneseaeedee aapake lie surakshit chanelle. hamesha nemo ka lebal padhen aur nirdeshon ka paalan pierre. main apane lakshanon ko kaise prabandhit anna sakata hoon?  nirdeshaanusaar taral padaarth piyen. haidreted rahane ke lie aapako saamaany se adhik taral padaarth peene gracy aavashyakata ho sakatee pradip. poKentucky River Medical Centerhen ki pratidin kitana taral padaarth peena pradip aur kaun sa taral padaarth aapake lie sarvottam pradip.  cierra mist hyoomidifaayar ka upayog pierre. isase aapake ghar mein hava mein namee bad jaatee pradip. isase aapako saans adam mein aasaanee ho sakatee pradip aur aapakee khaansee anton karane mein madad mil sakatee pradip. namee   adhik luke jay. aaraam aapake shareer ko theek carley mein madad karata pradip. dheere-dheere mike din adhik karana shuroo pierre. jab aapako isakee aavashyakata mahasoos ho to deepaabulmaro jay. main teevr bronkaitis ko rokane mein kaise madad anna sakata hoon?   un teekon ke baare mein poochhen jinakee aapako aavashyakata ho sakatee pradip. pratyek varsh yathaasheeghr phloo ka teeka lagavaen, aamataur par sitambar ya aktoobar mein. apane svaasthy seva pradaata se poochhen ki cintia aapako nimoniya ya chovid-19 ka teeka bhee lagavaana chaahie. aapaka svaasthy seva pradaata aapako bata sakata pradip ki cintia aapako any teeke bhee lagavaane chaahie aur unhen kab lagavaana chaahie.  rickaanuon ke prasaar ko donken. apane haath baar-baar saabun aur paanee se dhoen. apane saath rogaanu-naashak haind [facility] ya jel rakhen. jab saabun aur paanee upalabdh na ho to aap apane haathon ko saaph karane ke lie loshan ya jel ka upayog anna sakate chanelle. haath dhona   jab josué aapane pahale apane haath nahin dho lie hon tab josué apanee aankhon, naak ya munh ko na chhuen.  keetaanuon ko phailane se rokane ke lie khaansate samay hamesha apana munh tomken. apane haath gracy bajaay tishyoo ya apanee shart gracy aasteen mein khaansana sabase achchha pradip. apane aas-paas ke logon se kahen ki ve khaansate samay apana munh dhak tommie.  jin logon ko sardee ya phloo pradip unase bachane gracy koshish pierre. agar aap beemaar chanelle to jitana ho sake doosaron se door rahen.  hava mein maujood jalan paida karane vaale tatvon se bachen. rasaayanon, dhuen aur dhool se bachen. hal aapako dhool ya dhuen ke aasapaas kaam karana pradip to phes maask pahanen. jin dinon vaayu pradooshan ka star adhik ho to ghar ke andar rima rahen. hal aapako elarjee pradip, to paraagakanon gracy sankhya adhik carley par [facility] ke andColumbia Basin Hospitale rahen. spre-on diodorent, bag spre aur heyar spre jaise erosol [facility] na pierre.  dhoomrapaan na pierre ya dhoomrapaan karane vaale any logon ke aasapaas na rahen. sigaret aur sigaar mein maujood nikoteen aur any rasaayan phephadon ko nukasaan pahuncha sakate chanelle. hal aap vartamaan mein dhoomrapaan karate chanelle aur ise chhodane ke lie sahaayata gracy aavashyakata pradip, to jaanakaaree ke lie apane svaasthy seva pradaata se poochhen. ee-sigaret ya dhuaan rahit tambaakoo mein abhee bhee nikoteen hota pradip. in [facility] Cent[facility]o karane se pahale apane svaasthy seva pradaata se baat pierre. mujhe tatkaal dekhabhaal kab lenee chaahie?  aapako khaansee ke saath khoon aata pradip.  aapake honth ya naakhoon neele pad jaate chanelle.  jab aap saans lete chanelle to aapako harlan mahasoos hota pradip ki aapako paryaapt hava nahin mil rahee pradip. mujhe apane doktar ko kab bulaana chaahie?  upachaar ke baad bhee aapake lakshan door nahin hote ya badatar ho jaate chanelle.  aapakee khaansee 4 saptaah ke bheetar theek nahin hotee pradip.  aapakee sthiti ya dekhabhaal ke baare mein aapake koee prashn ya chintaen chanelle. dekhabhaal samajhauta:  aapako apanee dekhabhaal ko niyojit karane mein madad karane ka hak pradip. apanee svaasthy sthiti ke baare mein jaanen aur isaka ilaaj kaise alejandra ja sakata pradip. aap kaun see ckhaal praapt karana chaahate chanelle, yah ciara karane ke lie apane svaasthy seva pradaataon ke saa upachaar vikalpon par charcha pierre. aapake paas hamesha ilaaj se mirela karane ka adhikaar pradip. Show more

## 2024-04-20 NOTE — ED PROVIDER NOTE - NSICDXPASTMEDICALHX_GEN_ALL_CORE_FT
PAST MEDICAL HISTORY:  Benign essential HTN     DM (diabetes mellitus)     HLD (hyperlipidemia)     Hypothyroid

## 2024-04-20 NOTE — ED ADULT NURSE NOTE - NSFALLUNIVINTERV_ED_ALL_ED
Bed/Stretcher in lowest position, wheels locked, appropriate side rails in place/Call bell, personal items and telephone in reach/Instruct patient to call for assistance before getting out of bed/chair/stretcher/Non-slip footwear applied when patient is off stretcher/[place] to call system/Physically safe environment - no spills, clutter or unnecessary equipment/Purposeful proactive rounding/Room/bathroom lighting operational, light cord in reach

## 2024-04-20 NOTE — ED PROVIDER NOTE - PROGRESS NOTE DETAILS
Pt is well appearing walking with steady gait, stable for discharge and follow up without fail with medical doctor. I had a detailed discussion with the patient and/or guardian regarding the historical points, exam findings, and any diagnostic results supporting the discharge diagnosis. Pt educated on care and need for follow up. Strict return instructions and red flag signs and symptoms discussed with patient. Questions answered. Pt shows understanding of discharge information and agrees to follow. Pt is well appearing walking with steady gait, stable for discharge and follow up without fail with medical doctor. I had a detailed discussion with the patient and/or guardian regarding the historical points, exam findings, and any diagnostic results supporting the discharge diagnosis. Pt educated on care and need for follow up. Strict return instructions and red flag signs and symptoms discussed with patient. Questions answered. Pt shows understanding of discharge information and agrees to follow. Pt discharged home with prescriptions for Augmentin, doxycyline, and prednisone.

## 2025-02-07 NOTE — ED STATDOCS - NS ED MD DISPO DISCHARGE CCDA
Please see below, advise.  Thanks!    Patient stopped by office to obtain preventative prescription to Amoxicillin to be taken prior to upcoming dental procedure.  Chart was reviewed to obtain previous prescription, prescription prepared pending approval.          amoxicillin (AMOXIL) 500 MG capsule [[id number]]  DISCONTINUED    Order Details  Dose: 2,000 mg Route: Oral Frequency: PRN for dental   Dispense Quantity: 16 capsule Refills: 0          Sig: Take 4 capsules by mouth as needed (dental). Take one hour prior to procedure            Patient/Caregiver provided printed discharge information.

## 2025-04-04 NOTE — ED ADULT NURSE NOTE - TEMPLATE
Hospitalist Infectious Disease Heme/Onc [facility] Hospitalist Heme/Onc Heme/Onc Infectious Disease [facility] ENT Hospitalist Hospitalist General